# Patient Record
Sex: MALE | Race: WHITE
[De-identification: names, ages, dates, MRNs, and addresses within clinical notes are randomized per-mention and may not be internally consistent; named-entity substitution may affect disease eponyms.]

---

## 2018-01-17 ENCOUNTER — HOSPITAL ENCOUNTER (EMERGENCY)
Dept: HOSPITAL 42 - ED | Age: 83
LOS: 1 days | Discharge: HOME | End: 2018-01-18
Payer: COMMERCIAL

## 2018-01-17 VITALS — TEMPERATURE: 99.5 F

## 2018-01-17 DIAGNOSIS — K21.9: ICD-10-CM

## 2018-01-17 DIAGNOSIS — R42: Primary | ICD-10-CM

## 2018-01-17 DIAGNOSIS — E03.9: ICD-10-CM

## 2018-01-17 DIAGNOSIS — N39.0: ICD-10-CM

## 2018-01-17 DIAGNOSIS — M25.552: ICD-10-CM

## 2018-01-17 LAB
ALBUMIN SERPL-MCNC: 4 G/DL (ref 3–4.8)
ALBUMIN/GLOB SERPL: 1.4 {RATIO} (ref 1.1–1.8)
ALT SERPL-CCNC: 34 U/L (ref 7–56)
APPEARANCE UR: (no result)
AST SERPL-CCNC: 30 U/L (ref 17–59)
BACTERIA #/AREA URNS HPF: (no result) /[HPF]
BASOPHILS # BLD AUTO: 0.01 K/MM3 (ref 0–2)
BASOPHILS NFR BLD: 0.2 % (ref 0–3)
BILIRUB UR-MCNC: NEGATIVE MG/DL
BUN SERPL-MCNC: 17 MG/DL (ref 7–21)
CALCIUM SERPL-MCNC: 8.8 MG/DL (ref 8.4–10.5)
COLOR UR: YELLOW
EOSINOPHIL # BLD: 0 10*3/UL (ref 0–0.7)
EOSINOPHIL NFR BLD: 0.4 % (ref 1.5–5)
ERYTHROCYTE [DISTWIDTH] IN BLOOD BY AUTOMATED COUNT: 12.5 % (ref 11.5–14.5)
GFR NON-AFRICAN AMERICAN: > 60
GLUCOSE UR STRIP-MCNC: NEGATIVE MG/DL
GRANULOCYTES # BLD: 3.35 10*3/UL (ref 1.4–6.5)
GRANULOCYTES NFR BLD: 67.2 % (ref 50–68)
HGB BLD-MCNC: 13.7 G/DL (ref 14–18)
LEUKOCYTE ESTERASE UR-ACNC: (no result) LEU/UL
LYMPHOCYTES # BLD: 0.8 10*3/UL (ref 1.2–3.4)
LYMPHOCYTES NFR BLD AUTO: 15.7 % (ref 22–35)
MCH RBC QN AUTO: 30.1 PG (ref 25–35)
MCHC RBC AUTO-ENTMCNC: 34.3 G/DL (ref 31–37)
MCV RBC AUTO: 87.9 FL (ref 80–105)
MONOCYTES # BLD AUTO: 0.8 10*3/UL (ref 0.1–0.6)
MONOCYTES NFR BLD: 16.5 % (ref 1–6)
PH UR STRIP: 5.5 [PH] (ref 4.7–8)
PLATELET # BLD: 169 10^3/UL (ref 120–450)
PMV BLD AUTO: 8.8 FL (ref 7–11)
PROT UR STRIP-MCNC: 30 MG/DL
RBC # BLD AUTO: 4.55 10^6/UL (ref 3.5–6.1)
RBC # UR STRIP: NEGATIVE /UL
RBC #/AREA URNS HPF: (no result) /HPF (ref 0–2)
SP GR UR STRIP: >= 1.03 (ref 1–1.03)
T4 SERPL-MCNC: 6.6 UG/DL (ref 5.5–11)
TROPONIN I SERPL-MCNC: < 0.01 NG/ML
URINE NITRATE: NEGATIVE
UROBILINOGEN UR STRIP-ACNC: 0.2 E.U./DL
WBC # BLD AUTO: 5 10^3/UL (ref 4.5–11)

## 2018-01-17 NOTE — CT
EXAM:

  CT Head Without Intravenous Contrast



CLINICAL HISTORY:

  82 years old, male; Signs and symptoms; Dizziness and walking, difficulty; 

Additional info: Dizzy



TECHNIQUE:

  Axial computed tomography images of the head/brain without intravenous 

contrast.  All CT scans at this facility use one or more dose reduction 

techniques, viz.: automated exposure control; ma/kV adjustment per patient size 

(including targeted exams where dose is matched to indication; i.e. head); or 

iterative reconstruction technique.

  Coronal and sagittal reformatted images were created and reviewed.



COMPARISON:

  No relevant prior studies available.



FINDINGS:

  Brain:  Moderate atrophy.  No intracranial hemorrhage.  No mass.  Several 

scattered foci of decreased attenuation within periventricular/subcortical 

white matter.  No definite edema.

  Ventricles:  No hydrocephalus.

  Bones/joints:  No acute fracture.

  Soft tissues:  Unremarkable.

  Sinuses:  Scattered minimal mucosal thickening of ethmoid sinuses.

  Mastoid air cells:  No mastoid effusion.

  Orbits:  Unremarkable as visualized.



IMPRESSION:     

1.  Nonspecific white matter changes.  Acute infarction may be CT occult within 

first 24 hours.  If a focal deficit persists, consider followup CT or MRI for 

further evaluation.

2.  Incidental/non-acute findings are described above.

## 2018-01-17 NOTE — ED PDOC
Arrival/HPI





- General


Chief Complaint: Weakness/Neurological Deficit


Time Seen by Provider: 01/17/18 19:44


Historian: Patient





- History of Present Illness


Narrative History of Present Illness (Text): 


01/17/18 20:05


Sheeba James is an 82 year old male, whose past medical history includes 

hypothyroidis and GERD, who presents to the Emergency department complaining of 

dizziness today. Patient also reports unsteady gait. Patient denies any fever, 

chills, chest pain, shortness of breath, nausea, vomiting, diarrhea, urinary 

symptoms, back pain, neck pain, headache, or any other complaints.


Symptom Onset: Gradual


Symptom Course: Unchanged


Activities at Onset: Light


Context: Home





Past Medical History





- Provider Review


Nursing Documentation Reviewed: Yes





- Infectious Disease


Hx of Infectious Diseases: None





- Cardiac


Hx Cardiac Disorders: No





- Pulmonary


Hx Respiratory Disorders: No





- Neurological


Hx Neurological Disorder: No





- HEENT


Hx HEENT Disorder: No





- Renal


Hx Renal Disorder: No





- Endocrine/Metabolic


Hx Hypothyroidism: Yes





- Hematological/Oncological


Hx Blood Disorders: No





- Integumentary


Hx Dermatological Disorder: No





- Musculoskeletal/Rheumatological


Hx Musculoskeletal Disorders: No





- Gastrointestinal


Hx Gastrointestinal Disorders: Yes


Hx Gastroesophageal Reflux: Yes





- Genitourinary/Gynecological


Hx Genitourinary Disorders: No


Hx Prostate Problems: Yes





- Psychiatric


Hx Psychophysiologic Disorder: No


Hx Substance Use: No





- Anesthesia


Hx Anesthesia: Yes


Hx Anesthesia Reactions: No


Hx Malignant Hyperthermia: No





Family/Social History





- Physician Review


Nursing Documentation Reviewed: Yes


Family/Social History: Unknown Family HX


Smoking Status: Never Smoked


Hx Alcohol Use: No


Hx Substance Use: No





Allergies/Home Meds


Allergies/Adverse Reactions: 


Allergies





No Known Allergies Allergy (Unverified 01/17/18 20:09)


 











Review of Systems





- Physician Review


All systems were reviewed & negative as marked: Yes





- Review of Systems


Constitutional: Normal.  absent: Fevers


Eyes: Normal


ENT: Normal


Respiratory: Normal.  absent: SOB, Cough


Cardiovascular: Normal.  absent: Chest Pain


Gastrointestinal: Normal.  absent: Abdominal Pain, Diarrhea, Nausea, Vomiting


Genitourinary Male: Normal.  absent: Dysuria, Frequency, Hematuria, Urinary 

Output Changes


Musculoskeletal: Normal.  absent: Back Pain, Neck Pain


Skin: Normal.  absent: Rash


Neurological: Dizziness, Gait Changes.  absent: Headache


Endocrine: Normal


Hemo/Lymphatic: Normal


Psychiatric: Normal





Physical Exam


Vital Signs Reviewed: Yes


Vital Signs











  Temp Pulse Resp BP Pulse Ox


 


 01/17/18 23:47   79  18  123/68  99


 


 01/17/18 22:10   77  19  111/62  99


 


 01/17/18 20:16  99.5 F  82  18  119/68  96











Temperature: Afebrile


Blood Pressure: Normal


Pulse: Regular


Respiratory Rate: Normal


Appearance: Positive for: Well-Appearing, Non-Toxic, Comfortable


Pain Distress: None


Mental Status: Positive for: Alert and Oriented X 3


Finger Stick Blood Glucose: 98





- Systems Exam


Head: Present: Atraumatic, Normocephalic


Pupils: Present: PERRL


Extroacular Muscles: Present: EOMI


Conjunctiva: Present: Normal


Mouth: Present: Moist Mucous Membranes


Neck: Present: Normal Range of Motion


Respiratory/Chest: Present: Clear to Auscultation, Good Air Exchange.  No: 

Respiratory Distress, Accessory Muscle Use


Cardiovascular: Present: Regular Rate and Rhythm, Normal S1, S2.  No: Murmurs


Abdomen: Present: Normal Bowel Sounds.  No: Tenderness, Distention, Peritoneal 

Signs


Back: Present: Normal Inspection


Upper Extremity: Present: Normal Inspection.  No: Cyanosis, Edema


Lower Extremity: Present: Normal Inspection.  No: Edema


Neurological: Present: GCS=15, CN II-XII Intact, Speech Normal


Skin: Present: Warm, Dry, Normal Color.  No: Rashes


Psychiatric: Present: Alert, Oriented x 3, Normal Insight, Normal Concentration





Medical Decision Making


ED Course and Treatment: 


01/17/18 20:05


Impression:


82 year old male complaining of dizziness and unsteady gait today.





Plan:


-- CT Head w/o contrast


-- EKG


-- CXR


-- Labs, troponin


-- UA


-- Reassess and disposition





Prior Visits:


Notes and results from previous visits were reviewed. 





Progress Notes:


Reviewed EKG, NSR at 83 bpm. LAD. Non-specific ST/T wave changes.





01/17/18 22:46


Reviewed radiology, Chest X-ray shows no acute processes.


CT Head shows:


Brain: Moderate atrophy. No intracranial hemorrhage. No mass. Several scattered 

foci of


decreased attenuation within periventricular/subcortical white matter. No 

definite edema.


Ventricles: No hydrocephalus.


Bones/joints: No acute fracture.


Soft tissues: Unremarkable.


Sinuses: Scattered minimal mucosal thickening of ethmoid sinuses.


Mastoid air cells: No mastoid effusion.


Orbits: Unremarkable as visualized.


IMPRESSION:


1. Nonspecific white matter changes. Acute infarction may be CT occult within 

first 24 hours. If a


focal deficit persists, consider followup CT or MRI for further evaluation.


2. Incidental/non-acute findings are described above.





01/17/18 23:40


On re-evaluation, patient feels better and is in no acute distress. I have 

discussed the results and plan with the patient, who expresses understanding. 

Patient in agreement with plan to be discharged home. Patient is stable for 

discharge. Patient was instructed to follow up with physician or return if 

symptoms worsen or new concerning symptoms arise.





- Lab Interpretations


Microbiology Results: 


Microbiology Results





01/17/18 22:50   Urine,Clean Catch   Urine Culture - Final


                            10-50,000 CFU/ML.


                            MULTIPLE SPECIES. PROBABLE CONTAMINATION.








Lab Results: 








 01/17/18 21:15 





 01/17/18 21:15 





 Lab Results





01/17/18 22:50: Urine Color Yellow, Urine Appearance Sl cloudy, Urine pH 5.5, 

Ur Specific Gravity >= 1.030, Urine Protein 30 H, Urine Glucose (UA) Negative, 

Urine Ketones Trace H, Urine Blood Negative, Urine Nitrate Negative, Urine 

Bilirubin Negative, Urine Urobilinogen 0.2, Ur Leukocyte Esterase Trace H, 

Urine RBC 0 - 2, Urine WBC 5 - 10, Ur Epithelial Cells 1 - 3, Urine Bacteria Few


01/17/18 21:15: Thyroxine (T4) 6.6, TSH 3rd Generation 0.36 L


01/17/18 21:15: Sodium 131 L, Potassium 4.1, Chloride 98, Carbon Dioxide 26, 

Anion Gap 11, BUN 17, Creatinine 0.8, Est GFR (African Amer) > 60, Est GFR (Non-

Af Amer) > 60, Random Glucose 97, Calcium 8.8, Total Bilirubin 0.4, AST 30, ALT 

34, Alkaline Phosphatase 49, Troponin I < 0.01, Total Protein 6.7, Albumin 4.0, 

Globulin 2.8, Albumin/Globulin Ratio 1.4


01/17/18 21:15: WBC 5.0, RBC 4.55, Hgb 13.7 L, Hct 40.0 L, MCV 87.9, MCH 30.1, 

MCHC 34.3, RDW 12.5, Plt Count 169, MPV 8.8, Gran % 67.2, Lymph % (Auto) 15.7 L

, Mono % (Auto) 16.5 H, Eos % (Auto) 0.4 L, Baso % (Auto) 0.2, Gran # 3.35, 

Lymph # 0.8 L, Mono # 0.8 H, Eos # 0.0, Baso # 0.01


01/17/18 20:12: POC Glucose (mg/dL) 98








I have reviewed the lab results: Yes





- RAD Interpretation


Radiology Orders: 








01/17/18 20:09


HEAD W/O CONTRAST [CT] Stat 





01/17/18 20:10


CHEST ONE VIEW [RAD] Stat 











: ED Physician, Radiologist





- EKG Interpretation


Interpreted by ED Physician: Yes


Type: 12 lead EKG





- Medication Orders


Current Medication Orders: 











Discontinued Medications





Cephalexin Monohydrate (Keflex)  500 mg PO STAT STA


   PRN Reason: Protocol


   Stop: 01/17/18 23:30


   Last Admin: 01/17/18 23:55  Dose: 500 mg





Meclizine HCl (Antivert)  12.5 mg PO STAT STA


   Stop: 01/17/18 23:31


   Last Admin: 01/17/18 23:55  Dose: 12.5 mg











NIHSS Scale ([place])


Time Performed: 20:25





- How Severe is the Stoke


  ** Baseline


Level of Consciousness: 0=Alert


LOC to Questions: 0=Both comments correct


LOC to commands: 0=Obeys both correctly


Best Gaze: 0=Normal


Visual: 0=No visual loss


Facial: 0=Normal


Motor Arm - Left: NA - Amputation, joint fusion


Motor Leg - Left: 0=No drift


Motor Leg - Right: 0=No drift


Limb Ataxia: 0=Absent


Sensory: 0=Normal


Best Language: 0=No aphasia


Dysarthia: 0=Normal articulation


Extinction & Inattention (Neglect): 0=Normal, no object





- Scribe Statement


The provider has reviewed the documentation as recorded by the Scribnorman Dumont





All medical record entries made by the Scribe were at my direction and 

personally dictated by me. I have reviewed the chart and agree that the record 

accurately reflects my personal performance of the history, physical exam, 

medical decision making, and the department course for this patient. I have 

also personally directed, reviewed, and agree with the discharge instructions 

and disposition.





Disposition/Present on Arrival





- Present on Arrival


Any Indicators Present on Arrival: No


History of DVT/PE: No


History of Uncontrolled Diabetes: No


Urinary Catheter: No


History of Decub. Ulcer: No


History Surgical Site Infection Following: None





- Disposition


Have Diagnosis and Disposition been Completed?: Yes


Diagnosis: 


 Urinary tract infection, Dizziness, Hip pain, left





Disposition: HOME/ ROUTINE


Disposition Time: 23:40


Condition: GOOD


Discharge Instructions (ExitCare):  Urinary Tract Infection in Men (ED), Hip 

Sprain (ED), Dizziness (ED)


Prescriptions: 


Meclizine [Antivert] 12.5 mg PO TID #21 tab


Cephalexin [Keflex] 500 mg PO BID #14 capsule


Referrals: 


Brad Moses MD [Primary Care Provider] - Follow up with primary


Forms:  [employer] (English)

## 2018-01-18 VITALS — SYSTOLIC BLOOD PRESSURE: 123 MMHG | HEART RATE: 79 BPM | DIASTOLIC BLOOD PRESSURE: 68 MMHG | RESPIRATION RATE: 18 BRPM

## 2018-01-18 VITALS — OXYGEN SATURATION: 99 %

## 2018-01-18 NOTE — RAD
PROCEDURE:  CHEST RADIOGRAPH, 1 VIEW



HISTORY:

Dizziness 



COMPARISON:

None available.



FINDINGS:



LUNGS:

The lungs are clear.



PLEURA:

No pneumothorax or pleural fluid seen.



CARDIOVASCULAR:

Normal.



OSSEOUS STRUCTURES:

No significant abnormalities.



VISUALIZED UPPER ABDOMEN:

Normal.



OTHER FINDINGS:

None. 



IMPRESSION:

No active pulmonary disease.

## 2018-01-18 NOTE — CARD
--------------- APPROVED REPORT --------------





EKG Measurement

Heart [id number]

[id number]

[id number]

[id number]

[id number]



<Conclusion>

Normal sinus rhythm

Left axis deviation

NSSTW changes

## 2019-03-14 PROBLEM — Z00.00 ENCOUNTER FOR PREVENTIVE HEALTH EXAMINATION: Status: ACTIVE | Noted: 2019-03-14

## 2019-05-28 ENCOUNTER — APPOINTMENT (OUTPATIENT)
Dept: OPHTHALMOLOGY | Facility: CLINIC | Age: 84
End: 2019-05-28
Payer: MEDICARE

## 2019-05-28 DIAGNOSIS — H25.811 COMBINED FORMS OF AGE-RELATED CATARACT, RIGHT EYE: ICD-10-CM

## 2019-05-28 DIAGNOSIS — H43.812 VITREOUS DEGENERATION, LEFT EYE: ICD-10-CM

## 2019-05-28 DIAGNOSIS — H43.21 CRYSTALLINE DEPOSITS IN VITREOUS BODY, RIGHT EYE: ICD-10-CM

## 2019-05-28 DIAGNOSIS — H43.821 VITREOMACULAR ADHESION, RIGHT EYE: ICD-10-CM

## 2019-05-28 DIAGNOSIS — H25.812 COMBINED FORMS OF AGE-RELATED CATARACT, LEFT EYE: ICD-10-CM

## 2019-05-28 PROCEDURE — 92136 OPHTHALMIC BIOMETRY: CPT

## 2019-05-28 PROCEDURE — 92004 COMPRE OPH EXAM NEW PT 1/>: CPT

## 2019-05-28 PROCEDURE — 92134 CPTRZ OPH DX IMG PST SGM RTA: CPT

## 2019-06-17 ENCOUNTER — NON-APPOINTMENT (OUTPATIENT)
Age: 84
End: 2019-06-17

## 2019-06-17 ENCOUNTER — APPOINTMENT (OUTPATIENT)
Dept: OPHTHALMOLOGY | Facility: CLINIC | Age: 84
End: 2019-06-17
Payer: MEDICARE

## 2019-06-17 PROCEDURE — 92014 COMPRE OPH EXAM EST PT 1/>: CPT

## 2019-06-17 PROCEDURE — 92134 CPTRZ OPH DX IMG PST SGM RTA: CPT

## 2019-06-24 ENCOUNTER — APPOINTMENT (OUTPATIENT)
Dept: OPHTHALMOLOGY | Facility: AMBULATORY SURGERY CENTER | Age: 84
End: 2019-06-24
Payer: MEDICARE

## 2019-06-24 ENCOUNTER — OUTPATIENT (OUTPATIENT)
Dept: OUTPATIENT SERVICES | Facility: HOSPITAL | Age: 84
LOS: 1 days | Discharge: HOME | End: 2019-06-24

## 2019-06-24 PROCEDURE — V2787: CPT

## 2019-06-24 PROCEDURE — 66984 XCAPSL CTRC RMVL W/O ECP: CPT | Mod: RT

## 2019-06-25 ENCOUNTER — APPOINTMENT (OUTPATIENT)
Dept: OPHTHALMOLOGY | Facility: CLINIC | Age: 84
End: 2019-06-25
Payer: MEDICARE

## 2019-06-25 ENCOUNTER — NON-APPOINTMENT (OUTPATIENT)
Age: 84
End: 2019-06-25

## 2019-06-25 PROCEDURE — 99024 POSTOP FOLLOW-UP VISIT: CPT

## 2019-07-01 ENCOUNTER — APPOINTMENT (OUTPATIENT)
Dept: OPHTHALMOLOGY | Facility: CLINIC | Age: 84
End: 2019-07-01

## 2019-07-02 ENCOUNTER — APPOINTMENT (OUTPATIENT)
Dept: OPHTHALMOLOGY | Facility: CLINIC | Age: 84
End: 2019-07-02
Payer: MEDICARE

## 2019-07-02 ENCOUNTER — NON-APPOINTMENT (OUTPATIENT)
Age: 84
End: 2019-07-02

## 2019-07-02 PROCEDURE — 99024 POSTOP FOLLOW-UP VISIT: CPT

## 2019-07-23 ENCOUNTER — APPOINTMENT (OUTPATIENT)
Dept: OPHTHALMOLOGY | Facility: CLINIC | Age: 84
End: 2019-07-23
Payer: MEDICARE

## 2019-07-23 ENCOUNTER — NON-APPOINTMENT (OUTPATIENT)
Age: 84
End: 2019-07-23

## 2019-07-23 PROCEDURE — 99024 POSTOP FOLLOW-UP VISIT: CPT

## 2019-08-20 ENCOUNTER — NON-APPOINTMENT (OUTPATIENT)
Age: 84
End: 2019-08-20

## 2019-08-20 ENCOUNTER — APPOINTMENT (OUTPATIENT)
Dept: OPHTHALMOLOGY | Facility: CLINIC | Age: 84
End: 2019-08-20
Payer: MEDICARE

## 2019-08-20 PROCEDURE — 99024 POSTOP FOLLOW-UP VISIT: CPT

## 2019-09-10 ENCOUNTER — APPOINTMENT (OUTPATIENT)
Dept: OPHTHALMOLOGY | Facility: CLINIC | Age: 84
End: 2019-09-10
Payer: MEDICARE

## 2019-09-10 ENCOUNTER — NON-APPOINTMENT (OUTPATIENT)
Age: 84
End: 2019-09-10

## 2019-09-10 PROCEDURE — 99024 POSTOP FOLLOW-UP VISIT: CPT

## 2019-12-02 ENCOUNTER — APPOINTMENT (OUTPATIENT)
Dept: OPHTHALMOLOGY | Facility: CLINIC | Age: 84
End: 2019-12-02